# Patient Record
Sex: MALE | Race: WHITE | NOT HISPANIC OR LATINO | URBAN - METROPOLITAN AREA
[De-identification: names, ages, dates, MRNs, and addresses within clinical notes are randomized per-mention and may not be internally consistent; named-entity substitution may affect disease eponyms.]

---

## 2024-06-27 ENCOUNTER — TELEPHONE (OUTPATIENT)
Dept: PRIMARY CARE | Facility: CLINIC | Age: 49
End: 2024-06-27

## 2024-06-27 NOTE — TELEPHONE ENCOUNTER
Pt called stating he was a pt of  and wondering  if he could get  flexeril or cyclobenzaprine pt states he is having back pain. Pt's chart currently states he does not have a pcp so I'm not sure how long ago  seen this pt. I told pt I could see if  would take him as a pt again and if so we would  call to schedule an possible apt for medication

## 2025-01-27 ENCOUNTER — APPOINTMENT (OUTPATIENT)
Dept: PRIMARY CARE | Facility: CLINIC | Age: 50
End: 2025-01-27
Payer: COMMERCIAL

## 2025-01-27 ENCOUNTER — TELEPHONE (OUTPATIENT)
Dept: PRIMARY CARE | Facility: CLINIC | Age: 50
End: 2025-01-27

## 2025-01-27 VITALS
TEMPERATURE: 97.9 F | DIASTOLIC BLOOD PRESSURE: 77 MMHG | HEART RATE: 78 BPM | WEIGHT: 239.4 LBS | HEIGHT: 78 IN | BODY MASS INDEX: 27.7 KG/M2 | SYSTOLIC BLOOD PRESSURE: 116 MMHG

## 2025-01-27 DIAGNOSIS — Z13.220 SCREENING, LIPID: ICD-10-CM

## 2025-01-27 DIAGNOSIS — E66.3 OVERWEIGHT WITH BODY MASS INDEX (BMI) OF 27 TO 27.9 IN ADULT: ICD-10-CM

## 2025-01-27 DIAGNOSIS — M25.562 CHRONIC PAIN OF LEFT KNEE: ICD-10-CM

## 2025-01-27 DIAGNOSIS — F17.200 CURRENT SMOKER: ICD-10-CM

## 2025-01-27 DIAGNOSIS — M79.644 CHRONIC PAIN OF RIGHT THUMB: ICD-10-CM

## 2025-01-27 DIAGNOSIS — R53.83 FATIGUE, UNSPECIFIED TYPE: ICD-10-CM

## 2025-01-27 DIAGNOSIS — G89.29 CHRONIC PAIN OF RIGHT THUMB: ICD-10-CM

## 2025-01-27 DIAGNOSIS — G89.29 CHRONIC PAIN OF LEFT KNEE: ICD-10-CM

## 2025-01-27 DIAGNOSIS — M54.40 CHRONIC LOW BACK PAIN WITH SCIATICA, SCIATICA LATERALITY UNSPECIFIED, UNSPECIFIED BACK PAIN LATERALITY: Primary | ICD-10-CM

## 2025-01-27 DIAGNOSIS — R06.83 SNORING: ICD-10-CM

## 2025-01-27 DIAGNOSIS — Z12.11 SCREENING FOR COLON CANCER: ICD-10-CM

## 2025-01-27 DIAGNOSIS — R35.0 URINARY FREQUENCY: ICD-10-CM

## 2025-01-27 DIAGNOSIS — G89.29 CHRONIC LOW BACK PAIN WITH SCIATICA, SCIATICA LATERALITY UNSPECIFIED, UNSPECIFIED BACK PAIN LATERALITY: Primary | ICD-10-CM

## 2025-01-27 PROCEDURE — 3008F BODY MASS INDEX DOCD: CPT | Performed by: FAMILY MEDICINE

## 2025-01-27 PROCEDURE — 99204 OFFICE O/P NEW MOD 45 MIN: CPT | Performed by: FAMILY MEDICINE

## 2025-01-27 RX ORDER — VARENICLINE TARTRATE 1 MG/1
1 TABLET, FILM COATED ORAL 2 TIMES DAILY
Qty: 60 TABLET | Refills: 2 | Status: SHIPPED | OUTPATIENT
Start: 2025-01-27 | End: 2025-04-27

## 2025-01-27 RX ORDER — CYCLOBENZAPRINE HCL 10 MG
10 TABLET ORAL 3 TIMES DAILY PRN
Qty: 30 TABLET | Refills: 1 | Status: SHIPPED | OUTPATIENT
Start: 2025-01-27 | End: 2025-09-24

## 2025-01-27 RX ORDER — VARENICLINE TARTRATE 0.5 (11)-1
0.5 KIT ORAL 2 TIMES DAILY
Qty: 53 EACH | Refills: 0 | Status: SHIPPED | OUTPATIENT
Start: 2025-01-27 | End: 2025-04-27

## 2025-01-27 ASSESSMENT — PATIENT HEALTH QUESTIONNAIRE - PHQ9
SUM OF ALL RESPONSES TO PHQ9 QUESTIONS 1 AND 2: 2
1. LITTLE INTEREST OR PLEASURE IN DOING THINGS: SEVERAL DAYS
10. IF YOU CHECKED OFF ANY PROBLEMS, HOW DIFFICULT HAVE THESE PROBLEMS MADE IT FOR YOU TO DO YOUR WORK, TAKE CARE OF THINGS AT HOME, OR GET ALONG WITH OTHER PEOPLE: SOMEWHAT DIFFICULT
2. FEELING DOWN, DEPRESSED OR HOPELESS: SEVERAL DAYS

## 2025-01-27 NOTE — ASSESSMENT & PLAN NOTE
Patient reports knee is keeping him from doing activities.  Will have him see orthopedics for further evaluation.

## 2025-01-27 NOTE — PROGRESS NOTES
Subjective   Patient ID: Jayesh Menchaca is a 49 y.o. male who presents for [facility] (Pt states that he has no real concerns for today's visit.  ).  Patient presents today as a new patient to [facility].  He has several concerns.  He states that he has had issues with low back pain for many years.  He gets sciatica.  Mostly on the left sometimes on the right also.  He denies any bowel or bladder incontinence.  Does report he sometimes has some urinary frequency though.  He has taken Flexeril for this in the past and has been helpful.  He has done physical therapy which is also helped but symptoms did return.  He has not ever had an x-ray for it.  He also reports that he has had left knee pain.  That has gone on for a while.  Also that he has had right thumb pain.  He states that otherwise he does snore at night and would like to be tested for sleep apnea.  He does report feeling tired all the time.  He states that he eats healthy and is very active.  He denies any chest pain or shortness of breath or abdominal pain.  He denies any other concerns.  HPI  Social History     Socioeconomic History    Marital status:      Spouse name: Not on file    Number of children: Not on file    Years of education: Not on file    Highest education level: Not on file   Occupational History    Not on file   Tobacco Use    Smoking status: Every Day     Current packs/day: 1.00     Average packs/day: 1 pack/day for 33.1 years (33.1 ttl pk-yrs)     Types: Cigarettes     Start date: 1992    Smokeless tobacco: Never   Substance and Sexual Activity    Alcohol use: Not Currently    Drug use: Yes     Types: Marijuana     Comment: On occasion    Sexual activity: Not on file   Other Topics Concern    Not on file   Social History Narrative    Not on file     Social Drivers of Health     Financial Resource Strain: Not on file   Food Insecurity: Not on file   Transportation Needs: Not on file   Physical Activity: Not on file   Stress: Not  "on file   Social Connections: Not on file   Intimate Partner Violence: Not on file   Housing Stability: Not on file     Current Outpatient Medications   Medication Sig Dispense Refill    cyclobenzaprine (Flexeril) 10 mg tablet Take 1 tablet (10 mg) by mouth 3 times a day as needed for muscle spasms. 30 tablet 1    varenicline tartrate (Chantix SHAYAN) 0.5 mg (11)- 1 mg (42) tablet Take 0.5 mg by mouth 2 times a day. 53 each 0    varenicline tartrate (Chantix) 1 mg tablet Take 1 tablet (1 mg) by mouth 2 times a day. Take with full glass of water. 60 tablet 2     No current facility-administered medications for this visit.     Family History   Problem Relation Name Age of Onset    Heart disease Mother      Heart disease Maternal Grandmother      Heart disease Maternal Grandfather       Review of Systems  Immunization History   Administered Date(s) Administered    Moderna SARS-CoV-2 Vaccination 03/15/2021, 04/24/2021       Review of Systems negative except as noted in HPI and Chief complaint.     Objective                 /77 (BP Location: Left arm, Patient Position: Sitting)   Pulse 78   Temp 36.6 °C (97.9 °F)   Ht 1.981 m (6' 6\")   Wt 109 kg (239 lb 6.4 oz)   BMI 27.67 kg/m²    Physical Exam  Vitals reviewed.   HENT:      Head: Normocephalic and atraumatic.      Right Ear: Tympanic membrane normal.      Left Ear: Tympanic membrane normal.      Nose: Nose normal.      Mouth/Throat:      Pharynx: Oropharynx is clear.   Eyes:      Extraocular Movements: Extraocular movements intact.      Conjunctiva/sclera: Conjunctivae normal.      Pupils: Pupils are equal, round, and reactive to light.   Cardiovascular:      Rate and Rhythm: Normal rate and regular rhythm.      Pulses: Normal pulses.   Pulmonary:      Effort: Pulmonary effort is normal.      Breath sounds: Normal breath sounds.   Abdominal:      General: There is no distension.      Palpations: Abdomen is soft.      Tenderness: There is no abdominal " "tenderness.   Musculoskeletal:         General: Normal range of motion.      Cervical back: Normal range of motion and neck supple.      Right lower leg: No edema.      Left lower leg: No edema.   Lymphadenopathy:      Cervical: No cervical adenopathy.   Neurological:      General: No focal deficit present.      Mental Status: He is alert.       No results found for this or any previous visit (from the past 96 hours).  No results found for: \"WBC\", \"HGB\", \"HCT\", \"MCV\", \"PLT\"  No results found for: \"GLUCOSE\", \"CALCIUM\", \"NA\", \"K\", \"CO2\", \"CL\", \"BUN\", \"CREATININE\"  No results found for: \"CHOL\"  No results found for: \"HDL\"  No results found for: \"LDLCALC\"  No results found for: \"TRIG\"  No components found for: \"CHOLHDL\"   No results found for: \"TSH\"   No results found for: \"HGBA1C\"   No results found for: \"ALBUMINUR\"   Assessment/Plan   Problem List Items Addressed This Visit       Chronic low back pain with sciatica - Primary     Will proceed with x-ray and physical therapy.  I did give him a prescription for Flexeril.  Follow-up in a few months to reevaluate.         Relevant Medications    cyclobenzaprine (Flexeril) 10 mg tablet    Other Relevant Orders    XR lumbar spine 2-3 views    Referral to Physical Therapy    Fatigue     Check blood work as ordered.         Relevant Orders    Vitamin D 25-Hydroxy,Total (for eval of Vitamin D levels)    Vitamin B12    TSH with reflex to Free T4 if abnormal    Comprehensive Metabolic Panel    CBC and Auto Differential    Snoring     Patient with fatigue and snoring.  Will proceed with sleep study.         Relevant Orders    Home sleep apnea test (HSAT)    Overweight with body mass index (BMI) of 27 to 27.9 in adult    Urinary frequency     Will check urinalysis and PSA.  Follow-up if symptoms persist.         Relevant Orders    Urinalysis with Reflex Culture and Microscopic    PSA    Chronic pain of left knee     Patient reports knee is keeping him from doing activities.  Will " have him see orthopedics for further evaluation.         Relevant Orders    Referral to Orthopaedic Surgery    Chronic pain of right thumb     Patient also to have this evaluated with orthopedics.         Relevant Orders    Referral to Orthopaedic Surgery     Other Visit Diagnoses       Screening, lipid        Relevant Orders    Lipid Panel    Current smoker        Relevant Medications    varenicline tartrate (Chantix) 1 mg tablet    varenicline tartrate (Chantix SHAYAN) 0.5 mg (11)- 1 mg (42) tablet    Screening for colon cancer        Relevant Orders    Colonoscopy Screening; Average Risk Patient

## 2025-01-27 NOTE — ASSESSMENT & PLAN NOTE
Will proceed with x-ray and physical therapy.  I did give him a prescription for Flexeril.  Follow-up in a few months to reevaluate.

## 2025-01-27 NOTE — TELEPHONE ENCOUNTER
Patient was in today as new patient.    He said you discussed a lot and he forgot the decision regarding Flexerall.    If you are prescribing this, please send it to    [id number] - [facility], [place] - [address] Phone: [phone]   Fax: [phone]

## 2025-02-07 ENCOUNTER — APPOINTMENT (OUTPATIENT)
Dept: ORTHOPEDIC SURGERY | Facility: CLINIC | Age: 50
End: 2025-02-07
Payer: COMMERCIAL

## 2025-02-26 ENCOUNTER — APPOINTMENT (OUTPATIENT)
Dept: ORTHOPEDIC SURGERY | Facility: CLINIC | Age: 50
End: 2025-02-26
Payer: COMMERCIAL

## 2025-02-26 ENCOUNTER — HOSPITAL ENCOUNTER (OUTPATIENT)
Dept: RADIOLOGY | Facility: HOSPITAL | Age: 50
Discharge: HOME | End: 2025-02-26
Payer: COMMERCIAL

## 2025-02-26 VITALS — BODY MASS INDEX: 27.77 KG/M2 | HEIGHT: 78 IN | WEIGHT: 240 LBS

## 2025-02-26 DIAGNOSIS — M54.16 LUMBAR RADICULOPATHY: ICD-10-CM

## 2025-02-26 DIAGNOSIS — M54.50 LUMBAR PAIN: ICD-10-CM

## 2025-02-26 PROCEDURE — 99204 OFFICE O/P NEW MOD 45 MIN: CPT | Performed by: PHYSICIAN ASSISTANT

## 2025-02-26 PROCEDURE — 3008F BODY MASS INDEX DOCD: CPT | Performed by: PHYSICIAN ASSISTANT

## 2025-02-26 PROCEDURE — 72120 X-RAY BEND ONLY L-S SPINE: CPT

## 2025-02-26 RX ORDER — PREDNISONE 10 MG/1
TABLET ORAL
Qty: 30 TABLET | Refills: 0 | Status: SHIPPED | OUTPATIENT
Start: 2025-02-26

## 2025-02-26 NOTE — PROGRESS NOTES
New patient to us new to the practice comes to the office complaining of low back pain.  Radiating down the left leg the right radiating pain started about 2 weeks ago but he had back pain for leg 15 years.  He is got numbness and tingling.  Difficulty getting from sitting to standing.  No bowel or bladder complaints no saddle anesthesia no trauma accidents or falls to cause it no spine surgeries in the past.    We looked at patient's recent blood and I just see a bunch of new blood orders but nothing has been done yet.  Looked at primary doctors note check medication list see if he is on a statin medication.  I do not see that he is he is however taking muscle relaxant Flexeril 10 mg.    Physical exam: Well-nourished, well kept.  No lymphangitis or lymphadenopathy in the examined extremities.  Good perfusion to the extremities ×4.  Radial and dorsalis pedis pulses 2+.  Capillary refill to all 4 digits brisk.  No distal edema x 4.  Patient ambulating with no major difficulty.  Mild antalgic gait secondary to pain.  Full range of motion cervical spine motor strength intact pretty good range of motion to the lumbar spine once the patient stands up and gets oriented.  Able to reach down and touch his toes.  Good strength no instability.  Patient moving upper extremities by difficulty motor strength intact.  No redness, abrasions, or lesions on all 4 extremities, head and neck, or trunk.  Gross sensation intact in the extremities ×4.  Deep tendon reflexes 2+ and symmetric bilaterally.  Ruben, clonus, and Babinski were negative.  Straight leg raise negative.  Affect normal.  Alert and oriented ×3.  Coordination normal.  Examination of the lower extremities reveals no point tenderness, swelling, or deformity.  Range of motion of the hips, knees, and ankles are full without crepitance, instability, or exacerbation of pain.  Strength is 5/5 throughout.    X-ray: X-ray lumbar spine taken today and reviewed shows arthritic  degenerative changes most severe at L5-S1.  Very mild retrolisthesis L4 and 5.  No fractures dislocations bony lytic lesions no scoliosis hip joints preserved on AP x-ray.    Assessment: This a patient with chronic history of low back pain with acute exacerbation of symptoms now with radicular symptoms sensory changes down the left leg affecting his bodily function needs further workup.    Plan: Will try a steroid taper dose.  He will continue with muscle relaxants.  Will also get him in physical therapy at [facility], [place] by [facility] and will get an MRI at [place] for diagnostic purposes

## 2025-03-06 ENCOUNTER — APPOINTMENT (OUTPATIENT)
Dept: PHYSICAL THERAPY | Facility: CLINIC | Age: 50
End: 2025-03-06
Payer: COMMERCIAL

## 2025-03-14 ENCOUNTER — HOSPITAL ENCOUNTER (OUTPATIENT)
Dept: RADIOLOGY | Facility: HOSPITAL | Age: 50
Discharge: HOME | End: 2025-03-14
Payer: COMMERCIAL

## 2025-03-14 DIAGNOSIS — M54.50 LUMBAR PAIN: ICD-10-CM

## 2025-03-14 DIAGNOSIS — M54.16 LUMBAR RADICULOPATHY: ICD-10-CM

## 2025-03-14 PROCEDURE — 72148 MRI LUMBAR SPINE W/O DYE: CPT

## 2025-04-08 ENCOUNTER — APPOINTMENT (OUTPATIENT)
Dept: SLEEP MEDICINE | Facility: HOSPITAL | Age: 50
End: 2025-04-08
Payer: COMMERCIAL

## 2025-04-28 ENCOUNTER — APPOINTMENT (OUTPATIENT)
Dept: PRIMARY CARE | Facility: CLINIC | Age: 50
End: 2025-04-28
Payer: COMMERCIAL